# Patient Record
Sex: MALE | Race: WHITE | NOT HISPANIC OR LATINO | Employment: OTHER | URBAN - METROPOLITAN AREA
[De-identification: names, ages, dates, MRNs, and addresses within clinical notes are randomized per-mention and may not be internally consistent; named-entity substitution may affect disease eponyms.]

---

## 2022-12-02 ENCOUNTER — HOSPITAL ENCOUNTER (EMERGENCY)
Facility: HOSPITAL | Age: 47
Discharge: HOME | End: 2022-12-02

## 2022-12-02 VITALS
SYSTOLIC BLOOD PRESSURE: 142 MMHG | RESPIRATION RATE: 18 BRPM | DIASTOLIC BLOOD PRESSURE: 90 MMHG | TEMPERATURE: 97.8 F | OXYGEN SATURATION: 95 % | HEART RATE: 72 BPM | WEIGHT: 185 LBS

## 2022-12-02 DIAGNOSIS — Z00.00 VISIT FOR WELL MAN HEALTH CHECK: Primary | ICD-10-CM

## 2022-12-02 NOTE — Clinical Note
Terry Rome was seen and treated in our emergency department on 12/2/2022  No restrictions            Diagnosis:     López Shane  may return to work on return date  He may return on this date: 12/02/2022         If you have any questions or concerns, please don't hesitate to call        Monique Sepulveda MD    ______________________________           _______________          _______________  Hospital Representative                              Date                                Time

## 2022-12-02 NOTE — ED PROVIDER NOTES
History  Chief Complaint   Patient presents with   • Medical Problem     Was sick Tuesday and work is requiring him medical clearance to come back to work  Stated he is felling better and no longer sick  57 yo M with no significant medical history presents for work note  He couldn't get in with his pcp until January  Feels fine  Had a GI bug a few days ago  Tolerating po and has no complaints  History provided by:  Patient and medical records   used: No    Medical Problem  Severity:  Mild  Onset quality:  Gradual  Progression:  Resolved  Chronicity:  New  Associated symptoms: diarrhea, nausea and vomiting    Associated symptoms: no abdominal pain, no chest pain, no congestion, no cough, no ear pain, no fatigue, no fever, no headaches, no loss of consciousness, no myalgias, no rash, no rhinorrhea, no shortness of breath, no sore throat and no wheezing        Prior to Admission Medications   Prescriptions Last Dose Informant Patient Reported? Taking? amphetamine-dextroamphetamine (ADDERALL XR) 30 MG 24 hr capsule   Yes No   Sig: Take 30 mg by mouth every morning   diazepam (VALIUM) 10 mg tablet   Yes No   Sig: Take 10 mg by mouth every 6 (six) hours as needed for anxiety      Facility-Administered Medications: None       Past Medical History:   Diagnosis Date   • ADHD (attention deficit hyperactivity disorder)    • Anxiety        History reviewed  No pertinent surgical history  History reviewed  No pertinent family history  I have reviewed and agree with the history as documented      E-Cigarette/Vaping   • E-Cigarette Use Never User      E-Cigarette/Vaping Substances   • Nicotine No    • THC No    • CBD No    • Flavoring No    • Other No    • Unknown No      Social History     Tobacco Use   • Smoking status: Every Day     Packs/day: 1 00     Types: Cigarettes   Vaping Use   • Vaping Use: Never used   Substance Use Topics   • Alcohol use: No   • Drug use: Yes     Types: Marijuana, Prescription     Comment: in the past       Review of Systems   Constitutional: Negative for chills, diaphoresis, fatigue, fever and unexpected weight change  HENT: Negative for congestion, ear pain, rhinorrhea, sore throat, trouble swallowing and voice change  Eyes: Negative for pain and visual disturbance  Respiratory: Negative for cough, chest tightness, shortness of breath and wheezing  Cardiovascular: Negative for chest pain, palpitations and leg swelling  Gastrointestinal: Positive for diarrhea, nausea and vomiting  Negative for abdominal pain, blood in stool and constipation  Genitourinary: Negative for difficulty urinating and hematuria  Musculoskeletal: Negative for arthralgias, back pain, myalgias and neck pain  Skin: Negative for rash  Neurological: Negative for dizziness, loss of consciousness, syncope, light-headedness and headaches  Psychiatric/Behavioral: Negative for confusion and suicidal ideas  The patient is not nervous/anxious  Physical Exam  Physical Exam  Vitals and nursing note reviewed  Constitutional:       General: He is not in acute distress  Appearance: Normal appearance  He is well-developed and well-nourished  He is not ill-appearing, toxic-appearing or diaphoretic  HENT:      Head: Normocephalic and atraumatic  Right Ear: External ear normal       Left Ear: External ear normal       Nose: Nose normal       Mouth/Throat:      Mouth: Oropharynx is clear and moist    Eyes:      General: No scleral icterus  Right eye: No discharge  Left eye: No discharge  Extraocular Movements: EOM normal       Conjunctiva/sclera: Conjunctivae normal       Pupils: Pupils are equal, round, and reactive to light  Neck:      Vascular: No JVD  Trachea: No tracheal deviation  Cardiovascular:      Rate and Rhythm: Normal rate and regular rhythm  Pulses: Intact distal pulses  Heart sounds: Normal heart sounds  No murmur heard      No friction rub  No gallop  Pulmonary:      Effort: Pulmonary effort is normal  No respiratory distress  Breath sounds: Normal breath sounds  No stridor  No wheezing or rales  Chest:      Chest wall: No tenderness  Abdominal:      General: Bowel sounds are normal  There is no distension  Palpations: Abdomen is soft  Tenderness: There is no abdominal tenderness  There is no guarding or rebound  Musculoskeletal:         General: No tenderness, deformity or edema  Normal range of motion  Cervical back: Normal range of motion and neck supple  Lymphadenopathy:      Cervical: No cervical adenopathy  Skin:     General: Skin is warm and dry  Findings: No rash  Neurological:      General: No focal deficit present  Mental Status: He is alert and oriented to person, place, and time  Cranial Nerves: No cranial nerve deficit  Sensory: No sensory deficit        Coordination: Coordination normal    Psychiatric:         Mood and Affect: Mood and affect normal          Behavior: Behavior normal          Vital Signs  ED Triage Vitals [12/02/22 0341]   Temperature Pulse Respirations Blood Pressure SpO2   97 8 °F (36 6 °C) 72 18 142/90 95 %      Temp Source Heart Rate Source Patient Position - Orthostatic VS BP Location FiO2 (%)   Temporal Monitor Sitting Left arm --      Pain Score       No Pain           Vitals:    12/02/22 0341   BP: 142/90   Pulse: 72   Patient Position - Orthostatic VS: Sitting         Visual Acuity      ED Medications  Medications - No data to display    Diagnostic Studies  Results Reviewed     None                 No orders to display              Procedures  Procedures         ED Course                                             MDM  Number of Diagnoses or Management Options  Visit for well man health check: new and does not require workup     Amount and/or Complexity of Data Reviewed  Review and summarize past medical records: yes    Risk of Complications, Morbidity, and/or Mortality  Presenting problems: minimal  Diagnostic procedures: minimal  Management options: minimal    Patient Progress  Patient progress: improved      Disposition  Final diagnoses:   Visit for well man health check     Time reflects when diagnosis was documented in both MDM as applicable and the Disposition within this note     Time User Action Codes Description Comment    12/2/2022  3:43 AM Shabana Coburn [Z00 00] Visit for well man health check       ED Disposition     ED Disposition   Discharge    Condition   Stable    Date/Time   Fri Dec 2, 2022  3:43 AM    Comment   Clark Jose discharge to home/self care  Follow-up Information     Follow up With Specialties Details Why Contact Info Additional Information     [address] Emergency Department Emergency Medicine  If symptoms worsen [address] [place],[address] [address]  [address] [facility] Emergency Department, [address], [facility] [address]    [employer]  Call  call to establish a primary care provider  [phone]             Discharge Medication List as of 12/2/2022  3:44 AM      CONTINUE these medications which have NOT CHANGED    Details   amphetamine-dextroamphetamine (ADDERALL XR) 30 MG 24 hr capsule Take 30 mg by mouth every morning, Until Discontinued, Historical Med      diazepam (VALIUM) 10 mg tablet Take 10 mg by mouth every 6 (six) hours as needed for anxiety, Until Discontinued, Historical Med             No discharge procedures on file      PDMP Review     None          ED Provider  Electronically Signed by           Bean Hansen MD  12/02/22 0042

## 2024-05-06 ENCOUNTER — TELEPHONE (OUTPATIENT)
Dept: FAMILY MEDICINE CLINIC | Facility: CLINIC | Age: 49
End: 2024-05-06

## 2024-05-06 NOTE — TELEPHONE ENCOUNTER
Called pt to verify ins is truly a Medicare replacement - and to let him know if he needs to declare a PCP, he needs to get out practice name on his card before his appt tomorrow or it will not be covered.

## 2024-05-07 ENCOUNTER — OFFICE VISIT (OUTPATIENT)
Dept: FAMILY MEDICINE CLINIC | Facility: CLINIC | Age: 49
End: 2024-05-07
Payer: MEDICARE

## 2024-05-07 VITALS
OXYGEN SATURATION: 98 % | HEIGHT: 72 IN | TEMPERATURE: 99.9 F | HEART RATE: 84 BPM | RESPIRATION RATE: 18 BRPM | WEIGHT: 179 LBS | SYSTOLIC BLOOD PRESSURE: 128 MMHG | BODY MASS INDEX: 24.24 KG/M2 | DIASTOLIC BLOOD PRESSURE: 84 MMHG

## 2024-05-07 DIAGNOSIS — Z12.11 SCREENING FOR COLON CANCER: ICD-10-CM

## 2024-05-07 DIAGNOSIS — F41.9 ANXIETY AND DEPRESSION: Primary | ICD-10-CM

## 2024-05-07 DIAGNOSIS — F32.A ANXIETY AND DEPRESSION: Primary | ICD-10-CM

## 2024-05-07 PROCEDURE — 99203 OFFICE O/P NEW LOW 30 MIN: CPT | Performed by: STUDENT IN AN ORGANIZED HEALTH CARE EDUCATION/TRAINING PROGRAM

## 2024-05-07 NOTE — PROGRESS NOTES
Name: Girish Graf      : 1975      MRN: [id number]  Encounter Provider: Alia Adair MD  Encounter Date: 2024   Encounter department: [facility]    Assessment & Plan     1. Anxiety and depression  -     Ambulatory referral to Psych Services; Future    2. Screening for colon cancer  -     Cologuard    Anxiety and depression discussed with patient would benefit from SSRI as it would help with depression and anxiety, patient adamantly refuses all medications from that class, and is interested in refill of Valium.  Discussed with patient we have no records from previous provider on this.  Referral to psych as soon as possible has been placed patient in agreement  Discussed with patient will not be able to fill benzodiazepine given addiction history and he would benefit with something more long-term he refuses and is upset     Screening for colon cancer: Order for Cologuard placed    Subjective      Girish Randall is a 48-year-old male who presents to the office today to establish care.  Patient reports history of anxiety and depression not currently on any medications.  Patient reports he has a court date in  for a theft charge and getting into trouble last year which he has been very nervous about.  Patient reports he was incarcerated in  and is a ex heroin and fentanyl addict.  Patient feels like anxiety is worsening as states are getting closer to .  Patient would like prescription for Valium.  States he is not interested in SSRI, hydroxyzine or other medications.  Denies any SI HI.      Review of Systems   Constitutional:  Negative for fatigue.   Respiratory:  Negative for cough, chest tightness and shortness of breath.    Cardiovascular:  Negative for chest pain and palpitations.   Neurological:  Negative for dizziness, light-headedness and headaches.   Psychiatric/Behavioral:  Positive for depression. Negative for self-injury and suicidal ideas.  The patient is nervous/anxious.        Current Outpatient Medications on File Prior to Visit   Medication Sig    [DISCONTINUED] amphetamine-dextroamphetamine (ADDERALL XR) 30 MG 24 hr capsule Take 30 mg by mouth every morning    [DISCONTINUED] diazepam (VALIUM) 10 mg tablet Take 10 mg by mouth every 6 (six) hours as needed for anxiety (Patient not taking: Reported on 5/7/2024)       Objective     /84   Pulse 84   Temp 99.9 °F (37.7 °C)   Resp 18   Ht 6' (1.829 m)   Wt 81.2 kg (179 lb)   SpO2 98%   BMI 24.28 kg/m²     Physical Exam  Vitals reviewed.   Cardiovascular:      Rate and Rhythm: Normal rate and regular rhythm.      Pulses: Normal pulses.      Heart sounds: Normal heart sounds.   Pulmonary:      Effort: Pulmonary effort is normal.      Breath sounds: Normal breath sounds.   Neurological:      Mental Status: He is alert.   Psychiatric:         Mood and Affect: Mood is anxious.         Behavior: Behavior is agitated.         Thought Content: Thought content does not include homicidal or suicidal ideation.       Alia Adair MD

## 2024-05-08 ENCOUNTER — TELEPHONE (OUTPATIENT)
Dept: PSYCHIATRY | Facility: CLINIC | Age: 49
End: 2024-05-08

## 2024-05-08 NOTE — TELEPHONE ENCOUNTER
IC left voice message for pt abut referral received for services and possible placement on the wait list.

## 2024-05-09 NOTE — TELEPHONE ENCOUNTER
IC spoke with pt about STAT referral. Pt interested in med management at [facility] with no provider preference. Pt added to wait list as high priority,

## 2024-05-15 ENCOUNTER — OCCMED (OUTPATIENT)
Dept: URGENT CARE | Facility: CLINIC | Age: 49
End: 2024-05-15

## 2024-05-15 DIAGNOSIS — Z02.1 PRE-EMPLOYMENT EXAMINATION: Primary | ICD-10-CM

## 2024-05-31 LAB — COLOGUARD RESULT REPORTABLE: NEGATIVE

## 2024-08-15 ENCOUNTER — TELEPHONE (OUTPATIENT)
Dept: FAMILY MEDICINE CLINIC | Facility: CLINIC | Age: 49
End: 2024-08-15

## 2024-08-15 NOTE — TELEPHONE ENCOUNTER
2nd voicemail that we need to change his virtual appt to in person per Dr. Adair. He needs to be evaluated and tested in the office. If he cannot switch, he can go to urgent care.

## 2024-08-15 NOTE — TELEPHONE ENCOUNTER
LMOM for pt's to change his upcoming appt from virtual to in person due to having to test in the office and being evaluated or urgent care.    Asked for cb to change to in person instead of virtual per the doctor.

## 2024-09-09 ENCOUNTER — TELEPHONE (OUTPATIENT)
Age: 49
End: 2024-09-09

## 2024-09-09 NOTE — TELEPHONE ENCOUNTER
Outreach call made in an attempt to schedule pt from [facility] wait list. LVM to call back, [phone] - Opt #3 for the intake dept.    1st attempt      Insurance: [employer] ([employer] verified)  Insurance Type:    Commercial []   Medicaid [x]   [facility] (if applicable) - [place]

## 2024-09-19 NOTE — TELEPHONE ENCOUNTER
Outreach call placed in an attempt to schedule pt from [facility] wait list. LVM to call back, [phone] - Opt #3 for Intake Dept. Requested pt call within one week in order to proceed with scheduling.     2nd attempt  Removed from wait list